# Patient Record
(demographics unavailable — no encounter records)

---

## 2024-12-31 NOTE — CARDIOLOGY SUMMARY
[de-identified] : 12 31 2024: Sinus Rhythm  -Left axis. Left atrial enlargement. ABNORMAL  5 29 2024: Sinus Rhythm  -Left atrial enlargement.  7 26 2022   Sinus  Rhythm  -Combined atrial enlargement.   -Incomplete right bundle branch block and right axis -possible right ventricular hypertrophy  -   LVH .  ABNORMAL  [de-identified] : nov 2024"  LVEF 58%.  Mildly reduced RV funciton.  Alphonse mitral valve.  No reurgitation.  Acceptable gradients.  S/p Tv repair. No regurgiation.  Mild AI.  july 2023:  LA strain 22%.  LVEF 59%.  mild reduce Rv function. biopropstehtic mitrla gvlve. acceptable graidens. NOregurgitation.  MILD ai  today Point of care ultrasound by me : Severemitral stenosis. LVEF normal. very severly dilated RV and severe RV dysfunciton and very dialted IVC. rheumatic mitral stenosis.   [de-identified] : may 2023: no atrial fibrillation

## 2024-12-31 NOTE — PHYSICAL EXAM

## 2024-12-31 NOTE — DISCUSSION/SUMMARY
[Patient] : the patient [Risks] : risks [Benefits] : benefits [Alternatives] : alternatives [With Me] : with me [___ Year(s)] : in [unfilled] year(s) [EKG obtained to assist in diagnosis and management of assessed problem(s)] : EKG obtained to assist in diagnosis and management of assessed problem(s) [FreeTextEntry1] :     1) Severe mitral stenosis . rheumatic mitral stenosis.  s/p MV replacement.(t) valve.  s/p TV repair. cardai rehab.  ct dirueiss.  ECHO IN JULY   BNP cehck . 2)  first degree AV block: resolved Off beta blocker .  3) right heart failure: Pulm HTn: resolved.     BNP check  4) pulm embolism: likely due to rheumatic mitral stensis.     off anticoagulation  5) atrial fibrillation parodx post op. remodelleed. off anticoagulation . 4 weeks event monitor  restart metoprtolol.   weight gain is rebound after fixing the valve.  if has symptoms, then will  d/c it.  heart rate is elevted.  and may benefit form beta blocker  and prevent further atrial fibrillation tachycardia.   d/c enalrparil

## 2024-12-31 NOTE — HISTORY OF PRESENT ILLNESS
[FreeTextEntry1] : dyspnea on exertion   HPI for today: :    5 29 2024:  in nov ,  we reviewed transthoracic echocardiogram for the patient.  we came off anticoagulation . since his heart had remodelled.  he is on aspirin  old note: s/p Mitral valve replacement,  (t). and Tv repair, on  sept 27 th.    he got discharged yesterday.  he is walkign around the hospital floor 2-3 times a day./    old note:  feels good.  states he is gfeeling signfiicantl;y better.  he has list of questions before he goes for procedure. i discussed  options with other expert centers for Balloon valvotomy./  due to his valvualr calcifications and critical stenosis.  the risk and the inadequate outpcome obteind by procedure risk outweigh the benefits.   we disucsse with structural heart team at [facility], some other centers in and around [place]. [place], [facility].  no leg edema. feels hunfred tiem sbetter with diuresi and rate control. on anticoagulation   old noteL: This is a 49 year old male with history of smoking, here with dyspnea on exertion  he has  been dyspnea on exertion for 2 years.   he had progressively worsening dyspnea on exertion .  and he use to stop and take a break.  and then he quit smoking 10 mths ago. got better buit not perfect.  he did not make the appt.  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 so when he moved and felt short of breathe he thoguth eh is deconditionined.   he goes 1 flight of stairs he is ok.  1 year ago [employer] the car was a chore and dyspnea on exertion . since he stopped smoking. he is feelign better. he saw a pulmonoliogist. did a PFt. no significant COPD on PFt.   I did a  Point of care ultrasound : found to have rheuamtic mitrlastenosis. patient james smjessica remotely lizette has shistory of rheumatic fever. he was 10 years old. he was in [facility].    smoked for 30 years of smoking. <1 pack a day.   occasional alcohol. no drugs No second hand smoking  no    family history :  father and mother :No myocardial infarction . no cerebrovascular accident  Mother: at 80 double aleena replacement

## 2025-07-02 NOTE — HISTORY OF PRESENT ILLNESS
[FreeTextEntry8] : The patient presents for an acute visit secondary to recurrent episodes of right upper quadrant abdominal pain. The patient is 1 and only time he was seen in this office was his new patient annual wellness visit April 2023.  He states his right upper quadrant pain has been intermittent for about 6 months.  It happens about every 1 to 2 weeks and it usually occurs 3 to 5 hours after eating.  Sometimes a fatty meal sometimes not.  1 episode was associated with nausea otherwise no nausea, vomiting or change in bowels.  Between the episodes no issues.  No GERD.  No history of similar problems in the past.  Although is not here for this after his annual wellness visit April 2023 he had abnormal blood work for which she was supposed to follow-up 3 months later but never has. Abnormalities included hyperlipidemia, elevated hemoglobin A1c at 6 and higher than expected PSA for his age.

## 2025-07-02 NOTE — REVIEW OF SYSTEMS
[Abdominal Pain] : abdominal pain [Nausea] : nausea [Constipation] : no constipation [Diarrhea] : no diarrhea [Vomiting] : no vomiting [Heartburn] : no heartburn [Melena] : no melena [Negative] : Heme/Lymph

## 2025-07-02 NOTE — PHYSICAL EXAM
[No Acute Distress] : no acute distress [Well-Appearing] : well-appearing [No Respiratory Distress] : no respiratory distress  [No Accessory Muscle Use] : no accessory muscle use [Clear to Auscultation] : lungs were clear to auscultation bilaterally [Normal Rate] : normal rate  [Regular Rhythm] : with a regular rhythm [No Edema] : there was no peripheral edema [Soft] : abdomen soft [Non Tender] : non-tender [Non-distended] : non-distended [No Masses] : no abdominal mass palpated [No HSM] : no HSM [Normal Bowel Sounds] : normal bowel sounds [No Hernias] : no hernias [No Focal Deficits] : no focal deficits [Normal Affect] : the affect was normal [de-identified] : Anicteric

## 2025-07-02 NOTE — PHYSICAL EXAM
[No Acute Distress] : no acute distress [Well-Appearing] : well-appearing [No Respiratory Distress] : no respiratory distress  [No Accessory Muscle Use] : no accessory muscle use [Clear to Auscultation] : lungs were clear to auscultation bilaterally [Normal Rate] : normal rate  [Regular Rhythm] : with a regular rhythm [No Edema] : there was no peripheral edema [Soft] : abdomen soft [Non Tender] : non-tender [Non-distended] : non-distended [No Masses] : no abdominal mass palpated [No HSM] : no HSM [Normal Bowel Sounds] : normal bowel sounds [No Hernias] : no hernias [No Focal Deficits] : no focal deficits [Normal Affect] : the affect was normal [de-identified] : Anicteric

## 2025-07-02 NOTE — ASSESSMENT
[FreeTextEntry1] : Recurrent right upper quadrant abdominal pain usually 3 to 6 hours postprandial with primary possibility being Myrtle lithiasis/intermittent cholecystitis therefore abdominal ultrasound ordered. Also CMP, lipase and CMP ordered.  Patient with elevated hemoglobin A1c with family history of diabetes therefore glucose as well as hemoglobin A1c ordered. Follow-up PSA ordered.  3-month follow-up for annual wellness visit scheduled.

## 2025-07-17 NOTE — REASON FOR VISIT
[Consultation] : a consultation visit [FreeTextEntry1] : intermittent right upper abdominal pain and gallstones

## 2025-07-17 NOTE — DATA REVIEWED
[FreeTextEntry1] : Independent review of the abdominal ultrasound reveals gallstones with no gallbladder wall thickening and no evidence of cholecystitis.

## 2025-07-17 NOTE — CONSULT LETTER
[Dear  ___] : Dear  [unfilled], [Consult Letter:] : I had the pleasure of evaluating your patient, [unfilled]. [Please see my note below.] : Please see my note below. [Consult Closing:] : Thank you very much for allowing me to participate in the care of this patient.  If you have any questions, please do not hesitate to contact me. [Sincerely,] : Sincerely, [FreeTextEntry3] : Tate Gong MD, FACS   Department of Surgery [facility]

## 2025-07-17 NOTE — PHYSICAL EXAM
[JVD] : no jugular venous distention  [Purpura] : no purpura  [Petechiae] : no petechiae [Skin Ulcer] : no ulcer [Skin Induration] : no induration [Alert] : alert [Oriented to Person] : oriented to person [Oriented to Place] : oriented to place [Oriented to Time] : oriented to time [Calm] : calm [de-identified] : nontoxic, in no acute distress [de-identified] : NC/AT PERRL EOMI no scleral icterus  [de-identified] : trachea midline, no gross mass  [de-identified] : no audible wheezing or stridor  [de-identified] : mildly obese soft, no localizing tenderness, no guarding, no rebound, no masses  [de-identified] : FROM of the extremities with no gross angulation or deformity, there is no abdominal wall herniation [de-identified] : median sternotomy scar noted consistent with prior surgical history  [de-identified] : mood is calm

## 2025-07-17 NOTE — ASSESSMENT
[FreeTextEntry1] : The patient is a 52-year-old male with intermittent right upper abdominal pain and gallstones.  The patient has been advised that although his symptoms appear rather atypical, it is likely that the pain is from the gallstones. We discussed that the only effective way to know for sure would be cholecystectomy and monitor post op.  The risks, benefits, and alternatives including the option of doing nothing to a robotic, possible laparoscopic, and possible open cholecystectomy were discussed.  The potential complications including but not limited to infection, bleeding, bile leak, bowel injury and/or obstruction, chronic post op or recurrent abdominal pain, and possible bile duct injury were discussed.  The patient also understands that post op dietary tolerances and bowel movements may also be affected and different from prior to surgery.  The patient understands and wishes to proceed at the next available time.  A total of 40 minutes was spent coordinating the patient's care.

## 2025-07-17 NOTE — HISTORY OF PRESENT ILLNESS
[de-identified] : The patient comes to the office in consultation by Dr. Donte Rojo for evaluation of a intermittent right upper abdominal pain.   The patient for the last year has been having intermittent right upper abdominal pain that hits in the early morning hours while sleeping. He states that the pain often hits around 1 to 2 AM and waked him up from his sleep.  The pain is sharp in nature and last for an hour to two hours. He has reported that on occasion he will experience the pain during the day following heavy meals, but it is far more common to hit while he sleeps.